# Patient Record
Sex: FEMALE | Race: WHITE | NOT HISPANIC OR LATINO | Employment: FULL TIME | URBAN - METROPOLITAN AREA
[De-identification: names, ages, dates, MRNs, and addresses within clinical notes are randomized per-mention and may not be internally consistent; named-entity substitution may affect disease eponyms.]

---

## 2017-08-21 ENCOUNTER — TELEPHONE (OUTPATIENT)
Dept: ORTHOPEDICS | Facility: CLINIC | Age: 22
End: 2017-08-21
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
## 2017-08-22 ENCOUNTER — HOSPITAL ENCOUNTER (OUTPATIENT)
Dept: RADIOLOGY | Facility: HOSPITAL | Age: 22
Discharge: HOME | End: 2017-08-22
Attending: ORTHOPAEDIC SURGERY
Payer: COMMERCIAL

## 2017-08-22 ENCOUNTER — OFFICE VISIT (OUTPATIENT)
Dept: ORTHOPEDICS | Facility: CLINIC | Age: 22
End: 2017-08-22
Payer: COMMERCIAL

## 2017-08-22 VITALS
HEART RATE: 58 BPM | SYSTOLIC BLOOD PRESSURE: 116 MMHG | WEIGHT: 190.94 LBS | HEIGHT: 70 IN | DIASTOLIC BLOOD PRESSURE: 73 MMHG | BODY MASS INDEX: 27.34 KG/M2

## 2017-08-22 DIAGNOSIS — S93.401A SPRAIN OF RIGHT ANKLE, UNSPECIFIED LIGAMENT, INITIAL ENCOUNTER: ICD-10-CM

## 2017-08-22 DIAGNOSIS — M25.551 RIGHT HIP PAIN: Primary | ICD-10-CM

## 2017-08-22 DIAGNOSIS — M25.571 ACUTE RIGHT ANKLE PAIN: ICD-10-CM

## 2017-08-22 DIAGNOSIS — S76.011A HIP STRAIN, RIGHT, INITIAL ENCOUNTER: ICD-10-CM
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
## 2017-08-22 PROCEDURE — 73610 X-RAY EXAM OF ANKLE: CPT | Mod: 26,RT,, | Performed by: RADIOLOGY

## 2017-08-22 PROCEDURE — 99999 PR PBB SHADOW E&M-EST. PATIENT-LVL III: CPT | Mod: PBBFAC,,, | Performed by: ORTHOPAEDIC SURGERY

## 2017-08-22 PROCEDURE — 73502 X-RAY EXAM HIP UNI 2-3 VIEWS: CPT | Mod: TC,PO,RT

## 2017-08-22 PROCEDURE — 3008F BODY MASS INDEX DOCD: CPT | Mod: S$GLB,,, | Performed by: ORTHOPAEDIC SURGERY

## 2017-08-22 PROCEDURE — 73502 X-RAY EXAM HIP UNI 2-3 VIEWS: CPT | Mod: 26,RT,, | Performed by: RADIOLOGY

## 2017-08-22 PROCEDURE — 99214 OFFICE O/P EST MOD 30 MIN: CPT | Mod: S$GLB,,, | Performed by: ORTHOPAEDIC SURGERY

## 2017-08-22 PROCEDURE — 73610 X-RAY EXAM OF ANKLE: CPT | Mod: TC,PO,RT

## 2017-08-22 RX ORDER — TIZANIDINE 4 MG/1
4 TABLET ORAL EVERY 8 HOURS
COMMUNITY
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
## 2017-08-23 NOTE — PATIENT INSTRUCTIONS
ACE Wrap  Minor muscle or joint injuries are often treated with an elastic bandage. The bandage provides support and compression to the injured area. An elastic bandage is a stretchy, rolled bandage. Elastic bandages range in width from 2 to 6 inches. They can be used for a variety of injuries. The bandages are often called ACE bandages, after the most common brand name.  If used correctly, elastic bandages help control swelling and ease pain. An elastic bandage is also a good reminder not to overuse the injured area. However, elastic bandages do not provide a lot of support and will not prevent reinjury.  Home care    To apply an elastic bandage:  · Check the skin before wrapping the injury. It should be clean, dry, and free of drainage.  · Start wrapping below the injury and work your way toward the body. For an ankle sprain, start wrapping around the foot and work up toward the calf. This will help control swelling.  · Overlap the edges of the bandage so it stays snuggly in place.  · Wrap the bandage firmly, but not too tightly. A tight bandage can increase swelling on either end of the bandage. Make sure the bandage is wrinkle free.  · Leave fingers and toes exposed.  · Secure ends of the bandage (even self-sticking ones) with clips or tape.  · Check frequently to ensure adequate circulation, especially in the fingers and toes. Loosen the bandage if there is local swelling, numbness, tingling, discomfort, coldness, or discoloration (skin pale or bluish in color).  · Rewrap the bandage as needed during the day. Reroll the bandage as you unwind it.  Continue using the elastic bandage until the pain and swelling are gone or as your healthcare provider advises.  If you have been told to ice the area, the ice can be secured in place with the elastic bandage. Wrap the ice pack with a thin towel to protect the skin. Do not put ice or an ice pack directly on the skin.  Ice the area for no more than 20 minutes at a  time.    Follow-up care  Follow up with your healthcare provider, as advised.  When to seek medical advice  Call your healthcare provider for any of the following:  · Pain and swelling that doesn't get better or gets worse  · Trouble moving injured area  · Skin discoloration, numbness, or tingling that doesnt go away after bandage is removed  Date Last Reviewed: 9/13/2015  © 6285-5962 [employer]. [address] [place] [address], [place] [address]. All rights reserved. This information is not intended as a substitute for professional medical care. Always follow your healthcare professional's instructions.

## 2017-08-28 ENCOUNTER — HOSPITAL ENCOUNTER (OUTPATIENT)
Dept: RADIOLOGY | Facility: HOSPITAL | Age: 22
Discharge: HOME | End: 2017-08-28
Attending: ORTHOPAEDIC SURGERY
Payer: COMMERCIAL

## 2017-08-28 DIAGNOSIS — M25.571 ACUTE RIGHT ANKLE PAIN: ICD-10-CM

## 2017-08-28 DIAGNOSIS — M25.551 RIGHT HIP PAIN: ICD-10-CM

## 2017-08-28 PROCEDURE — 73721 MRI JNT OF LWR EXTRE W/O DYE: CPT | Mod: TC,RT

## 2017-08-29 ENCOUNTER — OFFICE VISIT (OUTPATIENT)
Dept: ORTHOPEDICS | Facility: CLINIC | Age: 22
End: 2017-08-29
Payer: COMMERCIAL

## 2017-08-29 VITALS
HEIGHT: 70 IN | RESPIRATION RATE: 18 BRPM | WEIGHT: 193.56 LBS | BODY MASS INDEX: 27.71 KG/M2 | SYSTOLIC BLOOD PRESSURE: 129 MMHG | DIASTOLIC BLOOD PRESSURE: 70 MMHG | HEART RATE: 68 BPM

## 2017-08-29 DIAGNOSIS — S73.101S HIP SPRAIN, RIGHT, SEQUELA: ICD-10-CM

## 2017-08-29 DIAGNOSIS — S93.401S SPRAIN OF RIGHT ANKLE, UNSPECIFIED LIGAMENT, SEQUELA: Primary | ICD-10-CM

## 2017-08-29 PROBLEM — S73.101A SPRAIN OF RIGHT HIP: Status: ACTIVE | Noted: 2017-08-29

## 2017-08-29 PROCEDURE — 99214 OFFICE O/P EST MOD 30 MIN: CPT | Mod: S$GLB,,, | Performed by: ORTHOPAEDIC SURGERY

## 2017-08-29 PROCEDURE — 99999 PR PBB SHADOW E&M-EST. PATIENT-LVL III: CPT | Mod: PBBFAC,,, | Performed by: ORTHOPAEDIC SURGERY

## 2017-08-29 PROCEDURE — 3008F BODY MASS INDEX DOCD: CPT | Mod: S$GLB,,, | Performed by: ORTHOPAEDIC SURGERY

## 2017-08-29 RX ORDER — NAPROXEN 500 MG/1
TABLET ORAL
Refills: 0 | COMMUNITY
Start: 2017-08-01

## 2017-08-29 RX ORDER — LISDEXAMFETAMINE DIMESYLATE 40 MG/1
CAPSULE ORAL
COMMUNITY
Start: 2016-10-03

## 2017-08-29 RX ORDER — LISDEXAMFETAMINE DIMESYLATE 60 MG/1
CAPSULE ORAL
Refills: 0 | COMMUNITY
Start: 2017-08-01
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
## 2017-08-29 NOTE — PATIENT INSTRUCTIONS
ACE Wrap  Minor muscle or joint injuries are often treated with an elastic bandage. The bandage provides support and compression to the injured area. An elastic bandage is a stretchy, rolled bandage. Elastic bandages range in width from 2 to 6 inches. They can be used for a variety of injuries. The bandages are often called ACE bandages, after the most common brand name.  If used correctly, elastic bandages help control swelling and ease pain. An elastic bandage is also a good reminder not to overuse the injured area. However, elastic bandages do not provide a lot of support and will not prevent reinjury.  Home care    To apply an elastic bandage:  · Check the skin before wrapping the injury. It should be clean, dry, and free of drainage.  · Start wrapping below the injury and work your way toward the body. For an ankle sprain, start wrapping around the foot and work up toward the calf. This will help control swelling.  · Overlap the edges of the bandage so it stays snuggly in place.  · Wrap the bandage firmly, but not too tightly. A tight bandage can increase swelling on either end of the bandage. Make sure the bandage is wrinkle free.  · Leave fingers and toes exposed.  · Secure ends of the bandage (even self-sticking ones) with clips or tape.  · Check frequently to ensure adequate circulation, especially in the fingers and toes. Loosen the bandage if there is local swelling, numbness, tingling, discomfort, coldness, or discoloration (skin pale or bluish in color).  · Rewrap the bandage as needed during the day. Reroll the bandage as you unwind it.  Continue using the elastic bandage until the pain and swelling are gone or as your healthcare provider advises.  If you have been told to ice the area, the ice can be secured in place with the elastic bandage. Wrap the ice pack with a thin towel to protect the skin. Do not put ice or an ice pack directly on the skin.  Ice the area for no more than 20 minutes at a  time.    Follow-up care  Follow up with your healthcare provider, as advised.  When to seek medical advice  Call your healthcare provider for any of the following:  · Pain and swelling that doesn't get better or gets worse  · Trouble moving injured area  · Skin discoloration, numbness, or tingling that doesnt go away after bandage is removed  Date Last Reviewed: 9/13/2015  © 1379-7287 [employer]. [address] [place] [address], [place] [address]. All rights reserved. This information is not intended as a substitute for professional medical care. Always follow your healthcare professional's instructions.
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
